# Patient Record
Sex: MALE | Race: OTHER | HISPANIC OR LATINO | URBAN - METROPOLITAN AREA
[De-identification: names, ages, dates, MRNs, and addresses within clinical notes are randomized per-mention and may not be internally consistent; named-entity substitution may affect disease eponyms.]

---

## 2018-03-05 ENCOUNTER — EMERGENCY (EMERGENCY)
Facility: HOSPITAL | Age: 33
LOS: 1 days | Discharge: HOME | End: 2018-03-05
Attending: EMERGENCY MEDICINE
Payer: COMMERCIAL

## 2018-03-05 VITALS
OXYGEN SATURATION: 98 % | SYSTOLIC BLOOD PRESSURE: 113 MMHG | HEART RATE: 91 BPM | TEMPERATURE: 98 F | RESPIRATION RATE: 22 BRPM | DIASTOLIC BLOOD PRESSURE: 75 MMHG

## 2018-03-05 VITALS — HEIGHT: 69 IN | WEIGHT: 210.1 LBS

## 2018-03-05 PROCEDURE — 73630 X-RAY EXAM OF FOOT: CPT | Mod: 26,LT

## 2018-03-05 PROCEDURE — 99283 EMERGENCY DEPT VISIT LOW MDM: CPT

## 2018-03-05 PROCEDURE — 99283 EMERGENCY DEPT VISIT LOW MDM: CPT | Mod: 25

## 2018-03-05 PROCEDURE — 73630 X-RAY EXAM OF FOOT: CPT

## 2018-03-05 RX ORDER — CEPHALEXIN 500 MG
1 CAPSULE ORAL
Qty: 21 | Refills: 0 | OUTPATIENT
Start: 2018-03-05 | End: 2018-03-11

## 2018-03-05 NOTE — ED PROVIDER NOTE - OBJECTIVE STATEMENT
31 yo M denies any PMH presented to ED with c/o fb to foot. Pt states that he stepped on sowing needle over a year ago but over the last couple of days developed pain with ambulation. No fevers/chills. No N/V. No redness or swelling

## 2018-03-05 NOTE — ED PROVIDER NOTE - ATTENDING CONTRIBUTION TO CARE
I personally saw the patient with the PA, and completed the key components of the history and physical exam. I then discussed the management plan with the PA.    L foot pain x 3 days;  pt states he stepped on a sewing needle 1 year ago but never retrieved the needle. On exam pt has normal skin coloration ; no rashes. + mid foot tenderness anterior to heel. + horizontal sewing needle visualized quite deep, high up and lying horizontally. Plan to refer to podiatry for likely surgical retrieval.

## 2018-03-05 NOTE — ED PROVIDER NOTE - SKIN, MLM
Skin normal color for race, warm, dry and intact. No evidence of rash. No redness. No sts. No palpable fb. No d/c

## 2018-03-05 NOTE — ED ADULT NURSE NOTE - OBJECTIVE STATEMENT
Patient arrived to ED today with c/o left foot pain after a sewing needle got stuck in his foot for a couple of weeks.

## 2018-04-03 ENCOUNTER — EMERGENCY (EMERGENCY)
Facility: HOSPITAL | Age: 33
LOS: 1 days | Discharge: HOME | End: 2018-04-03
Attending: EMERGENCY MEDICINE | Admitting: EMERGENCY MEDICINE
Payer: SELF-PAY

## 2018-04-03 VITALS
SYSTOLIC BLOOD PRESSURE: 124 MMHG | DIASTOLIC BLOOD PRESSURE: 81 MMHG | OXYGEN SATURATION: 97 % | TEMPERATURE: 99 F | HEART RATE: 95 BPM

## 2018-04-03 VITALS — HEIGHT: 69 IN | WEIGHT: 225.09 LBS

## 2018-04-03 PROCEDURE — 99284 EMERGENCY DEPT VISIT MOD MDM: CPT

## 2018-04-03 NOTE — ED ADULT TRIAGE NOTE - CHIEF COMPLAINT QUOTE
rle infection states something got in leg at work, states [facility] and placed on [facility], states worsening redness rle infection states something got in leg at work, states went to [facility] yesterday and placed on antibiotics, states worsening redness and "getting bigger"

## 2018-04-03 NOTE — ED PROVIDER NOTE - MEDICAL DECISION MAKING DETAILS
Change abx to clindamycin. give first dose in ED. check x-ray to r/o foreign body. pt advised to return to ED in 24 hours

## 2018-04-03 NOTE — ED PROVIDER NOTE - OBJECTIVE STATEMENT
33 y/o M pt presents to ED for wound check. Pt states he got hit leg stuck in a piece of aluminum metal at work 5 days ago. Pt went to urgent care yesterday and was given Bactrim abx. Pt had subjective fever and chills. pt is applying warm towel to area  Dr. Tee

## 2018-04-04 ENCOUNTER — EMERGENCY (EMERGENCY)
Facility: HOSPITAL | Age: 33
LOS: 1 days | Discharge: HOME | End: 2018-04-04
Attending: EMERGENCY MEDICINE
Payer: SELF-PAY

## 2018-04-04 VITALS — HEIGHT: 68 IN | WEIGHT: 220.02 LBS

## 2018-04-04 DIAGNOSIS — Z79.2 LONG TERM (CURRENT) USE OF ANTIBIOTICS: ICD-10-CM

## 2018-04-04 DIAGNOSIS — Z79.891 LONG TERM (CURRENT) USE OF OPIATE ANALGESIC: ICD-10-CM
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
## 2018-04-04 DIAGNOSIS — S81.831D: ICD-10-CM

## 2018-04-04 DIAGNOSIS — L03.115 CELLULITIS OF RIGHT LOWER LIMB: ICD-10-CM
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
## 2018-04-04 PROCEDURE — 99282 EMERGENCY DEPT VISIT SF MDM: CPT

## 2018-04-04 PROCEDURE — 73590 X-RAY EXAM OF LOWER LEG: CPT

## 2018-04-04 PROCEDURE — 99284 EMERGENCY DEPT VISIT MOD MDM: CPT | Mod: 25

## 2018-04-04 PROCEDURE — 73590 X-RAY EXAM OF LOWER LEG: CPT | Mod: 26,RT

## 2018-04-04 PROCEDURE — 96374 THER/PROPH/DIAG INJ IV PUSH: CPT

## 2018-04-04 RX ORDER — AZTREONAM 2 G
1 VIAL (EA) INJECTION
Qty: 0 | Refills: 0 | COMMUNITY

## 2018-04-04 RX ORDER — OXYCODONE AND ACETAMINOPHEN 5; 325 MG/1; MG/1
1 TABLET ORAL ONCE
Qty: 0 | Refills: 0 | Status: DISCONTINUED | OUTPATIENT
Start: 2018-04-04 | End: 2018-04-04

## 2018-04-04 RX ADMIN — Medication 100 MILLIGRAM(S): at 01:01

## 2018-04-04 RX ADMIN — OXYCODONE AND ACETAMINOPHEN 1 TABLET(S): 5; 325 TABLET ORAL at 02:24

## 2018-04-04 NOTE — ED PROVIDER NOTE - OBJECTIVE STATEMENT
co right leg cellulitis after punctured by aluminum 6 days ago. started clinda yesterday after ER visit here. wound check today. no fever, pain is reduced, less erythema.

## 2018-04-04 NOTE — ED ADULT NURSE NOTE - OBJECTIVE STATEMENT
patient states was seen at Urgent care on Thursday and marked leg with purple marker and insturcted if redness goes outside line to go to hospital . ptn states he knelt on metal and and pain with redness to area. postive pedal and popiteal pulse noted.

## 2018-04-04 NOTE — ED ADULT NURSE NOTE - OBJECTIVE STATEMENT
Patient states that he was seen in the ED yesterday by Dr. Rodriguez. Patient states that he was told to come back to the ED tonight to be re-evaluated for cellulitis in his right leg.

## 2018-04-04 NOTE — ED ADULT NURSE NOTE - CHPI ED SYMPTOMS NEG
no purulent drainage/no bleeding at site/no chills/no fever/no vomiting/no drainage/no redness/no red streaks/no bleeding

## 2018-04-04 NOTE — ED PROVIDER NOTE - SKIN TEMP
warm/right lower leg anterior with erythema 7x10cm surrounding a central puncture wound, no d/c/bleeding    minimally tender, warm. distally n/v intact

## 2018-04-04 NOTE — ED ADULT TRIAGE NOTE - CHIEF COMPLAINT QUOTE
pt presents to ED for wound check. pt was seen in ED for redness and swelling to RLE yesterday. pt was told to come back to ED to see if there was improvement with new antibiotic as per dr chen per DC instructions. afebrile.

## 2018-04-04 NOTE — ED ADULT NURSE NOTE - CHIEF COMPLAINT QUOTE
pt presents to ED for wound check. pt was seen in ED for redness and swelling to RLE yesterday. pt was told to come back to ED to see if there was improvement with new antibiotic as per dr chne per DC instructions. afebrile.

## 2018-04-04 NOTE — ED ADULT NURSE NOTE - CHIEF COMPLAINT QUOTE
rle infection states something got in leg at work, states [facility] and placed on antibiotics, states worsening redness and "getting bigger"

## 2018-04-04 NOTE — ED PROVIDER NOTE - ATTENDING CONTRIBUTION TO CARE
Pt seen by myself day before for localized cellulitis and Abx changed to po Clindamycin after receiving 1st dose IV  in ED.  Pt c/o less pain and redness. No fever or chills.  On exam afebrile in NAD. R lower ant leg with decreased redness.  No signs abscess, FROM, NVI.  Pt to continue po Clinda and local care and f/u PMD

## 2018-04-04 NOTE — ED ADULT NURSE NOTE - CHPI ED SYMPTOMS NEG
no vomiting/no fever/no drainage/no bleeding/no red streaks/no purulent drainage/no bleeding at site/no chills

## 2021-04-22 NOTE — ED ADULT TRIAGE NOTE - BSA (M2)
Controlled.  However, patient concerned dizziness may be related to lisinopril.  Reassured this is not likely unless BP is going too low.  Patient does have a dry cough as well.  Will stop lisinopril and start losartan.    2.11

## 2022-07-31 NOTE — ED PROVIDER NOTE - LOCATION
Discharge Plan[de-identified] Home with Office Follow-up   Telemetry/Cardiac Monitoring Required?: Yes
[place]

## 2023-05-01 NOTE — ED PROVIDER NOTE - SKIN, MLM
6-7 cm cellulitis on right anterior calf Soolantra Counseling: I discussed with the patients the risks of topial Soolantra. This is a medicine which decreases the number of mites and inflammation in the skin. You experience burning, stinging, eye irritation or allergic reactions.  Please call our office if you develop any problems from using this medication.
